# Patient Record
Sex: FEMALE | Race: WHITE | Employment: FULL TIME | URBAN - METROPOLITAN AREA
[De-identification: names, ages, dates, MRNs, and addresses within clinical notes are randomized per-mention and may not be internally consistent; named-entity substitution may affect disease eponyms.]

---

## 2018-03-29 ENCOUNTER — OFFICE VISIT (OUTPATIENT)
Dept: FAMILY MEDICINE CLINIC | Facility: CLINIC | Age: 43
End: 2018-03-29

## 2018-03-29 VITALS
TEMPERATURE: 99 F | WEIGHT: 131 LBS | HEART RATE: 68 BPM | RESPIRATION RATE: 16 BRPM | DIASTOLIC BLOOD PRESSURE: 80 MMHG | BODY MASS INDEX: 21.83 KG/M2 | SYSTOLIC BLOOD PRESSURE: 100 MMHG | HEIGHT: 65 IN

## 2018-03-29 DIAGNOSIS — Z00.00 HEALTHY ADULT ON ROUTINE PHYSICAL EXAMINATION: Primary | ICD-10-CM

## 2018-03-29 DIAGNOSIS — Z00.00 PREVENTATIVE HEALTH CARE: ICD-10-CM

## 2018-03-29 LAB
ALBUMIN SERPL-MCNC: 3.9 G/DL (ref 3.5–4.8)
ALP LIVER SERPL-CCNC: 56 U/L (ref 37–98)
ALT SERPL-CCNC: 22 U/L (ref 14–54)
AST SERPL-CCNC: 17 U/L (ref 15–41)
BASOPHILS # BLD AUTO: 0.03 X10(3) UL (ref 0–0.1)
BASOPHILS NFR BLD AUTO: 0.6 %
BILIRUB SERPL-MCNC: 0.4 MG/DL (ref 0.1–2)
BUN BLD-MCNC: 12 MG/DL (ref 8–20)
CALCIUM BLD-MCNC: 8.7 MG/DL (ref 8.3–10.3)
CHLORIDE: 108 MMOL/L (ref 101–111)
CHOLEST SMN-MCNC: 142 MG/DL (ref ?–200)
CO2: 24 MMOL/L (ref 22–32)
CREAT BLD-MCNC: 0.65 MG/DL (ref 0.55–1.02)
EOSINOPHIL # BLD AUTO: 0.11 X10(3) UL (ref 0–0.3)
EOSINOPHIL NFR BLD AUTO: 2.3 %
ERYTHROCYTE [DISTWIDTH] IN BLOOD BY AUTOMATED COUNT: 12.6 % (ref 11.5–16)
GLUCOSE BLD-MCNC: 78 MG/DL (ref 70–99)
HCT VFR BLD AUTO: 41.1 % (ref 34–50)
HDLC SERPL-MCNC: 68 MG/DL (ref 45–?)
HDLC SERPL: 2.09 {RATIO} (ref ?–4.44)
HGB BLD-MCNC: 13.8 G/DL (ref 12–16)
IMMATURE GRANULOCYTE COUNT: 0 X10(3) UL (ref 0–1)
IMMATURE GRANULOCYTE RATIO %: 0 %
LDLC SERPL CALC-MCNC: 66 MG/DL (ref ?–130)
LYMPHOCYTES # BLD AUTO: 1.42 X10(3) UL (ref 0.9–4)
LYMPHOCYTES NFR BLD AUTO: 29.2 %
M PROTEIN MFR SERPL ELPH: 7.4 G/DL (ref 6.1–8.3)
MCH RBC QN AUTO: 28.2 PG (ref 27–33.2)
MCHC RBC AUTO-ENTMCNC: 33.6 G/DL (ref 31–37)
MCV RBC AUTO: 84 FL (ref 81–100)
MONOCYTES # BLD AUTO: 0.4 X10(3) UL (ref 0.1–1)
MONOCYTES NFR BLD AUTO: 8.2 %
NEUTROPHIL ABS PRELIM: 2.9 X10 (3) UL (ref 1.3–6.7)
NEUTROPHILS # BLD AUTO: 2.9 X10(3) UL (ref 1.3–6.7)
NEUTROPHILS NFR BLD AUTO: 59.7 %
NONHDLC SERPL-MCNC: 74 MG/DL (ref ?–130)
PLATELET # BLD AUTO: 219 10(3)UL (ref 150–450)
POTASSIUM SERPL-SCNC: 4.3 MMOL/L (ref 3.6–5.1)
RBC # BLD AUTO: 4.89 X10(6)UL (ref 3.8–5.1)
RED CELL DISTRIBUTION WIDTH-SD: 37.9 FL (ref 35.1–46.3)
SODIUM SERPL-SCNC: 141 MMOL/L (ref 136–144)
TRIGL SERPL-MCNC: 41 MG/DL (ref ?–150)
VLDLC SERPL CALC-MCNC: 8 MG/DL (ref 5–40)
WBC # BLD AUTO: 4.9 X10(3) UL (ref 4–13)

## 2018-03-29 PROCEDURE — 99396 PREV VISIT EST AGE 40-64: CPT | Performed by: FAMILY MEDICINE

## 2018-03-29 PROCEDURE — 36415 COLL VENOUS BLD VENIPUNCTURE: CPT | Performed by: FAMILY MEDICINE

## 2018-03-29 PROCEDURE — 80061 LIPID PANEL: CPT | Performed by: FAMILY MEDICINE

## 2018-03-29 PROCEDURE — 85025 COMPLETE CBC W/AUTO DIFF WBC: CPT | Performed by: FAMILY MEDICINE

## 2018-03-29 PROCEDURE — 80053 COMPREHEN METABOLIC PANEL: CPT | Performed by: FAMILY MEDICINE

## 2018-03-29 NOTE — PROGRESS NOTES
HPI:   Redd Rebolledo is a 38year old female who presents for a complete physical exam. Symptoms: denies discharge, itching, burning or dysuria, periods are regular, following with gyne, just had a pap last year. . Patient complains of nothing new today. 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 : yes. Children: 2 girls . Exercise: walking.   Diet: watches minimally     REVIEW OF SYSTEMS:   GENERAL: feels well otherwise  S CBC. Not due for screening colonoscopy. Pt info handouts given for: exercise, low fat diet and breast self-exam. Pt' s weight is Body mass index is 21.8 kg/m². , recommended low fat diet and aerobic exercise 30 minutes three times weekly.   The patient indic

## 2018-03-30 ENCOUNTER — TELEPHONE (OUTPATIENT)
Dept: FAMILY MEDICINE CLINIC | Facility: CLINIC | Age: 43
End: 2018-03-30

## 2018-03-30 NOTE — TELEPHONE ENCOUNTER
----- Message from Barbi Bai DO sent at 3/30/2018 12:05 PM CDT -----  Pls let pt know that her Labs are back and are normal including cholesterol, liver and kidney tests and glucose. Blood cell counts are normal also.  We can repeat this at her next p

## 2018-12-13 ENCOUNTER — OFFICE VISIT (OUTPATIENT)
Dept: FAMILY MEDICINE CLINIC | Facility: CLINIC | Age: 43
End: 2018-12-13
Payer: COMMERCIAL

## 2018-12-13 VITALS
OXYGEN SATURATION: 99 % | HEART RATE: 88 BPM | DIASTOLIC BLOOD PRESSURE: 80 MMHG | TEMPERATURE: 98 F | SYSTOLIC BLOOD PRESSURE: 118 MMHG | RESPIRATION RATE: 16 BRPM | WEIGHT: 130 LBS | BODY MASS INDEX: 21.66 KG/M2 | HEIGHT: 65 IN

## 2018-12-13 DIAGNOSIS — J98.01 BRONCHOSPASM: ICD-10-CM

## 2018-12-13 DIAGNOSIS — J40 BRONCHITIS: Primary | ICD-10-CM

## 2018-12-13 DIAGNOSIS — R59.1 LYMPHADENOPATHY OF HEAD AND NECK: ICD-10-CM

## 2018-12-13 PROCEDURE — 99214 OFFICE O/P EST MOD 30 MIN: CPT | Performed by: FAMILY MEDICINE

## 2018-12-13 RX ORDER — BENZONATATE 100 MG/1
100 CAPSULE ORAL 3 TIMES DAILY PRN
Qty: 30 CAPSULE | Refills: 0 | Status: SHIPPED | OUTPATIENT
Start: 2018-12-13 | End: 2019-05-17

## 2018-12-13 RX ORDER — METHYLPREDNISOLONE 4 MG/1
TABLET ORAL
Qty: 1 KIT | Refills: 0 | Status: SHIPPED | OUTPATIENT
Start: 2018-12-13 | End: 2019-05-17

## 2018-12-13 RX ORDER — AZITHROMYCIN 250 MG/1
TABLET, FILM COATED ORAL
Qty: 6 TABLET | Refills: 0 | Status: SHIPPED | OUTPATIENT
Start: 2018-12-13 | End: 2019-05-17

## 2018-12-13 NOTE — PROGRESS NOTES
Grecia Colvin is a 41year old female. Patient presents with:  Cough  Lymph Node: behind ears    HPI:   Amor Levy presents to the office with complaints of upper respiratory tract infection, having congestion for 3 weeks.   She has had a cough and minimal  sputu exertion  CARDIOVASCULAR: denies chest pain on exertion  GI: no nausea or abdominal pain  NEURO: denies headaches    EXAM:   /80   Pulse 88   Temp 98.4 °F (36.9 °C) (Temporal)   Resp 16   Ht 65\"   Wt 130 lb   SpO2 99%   BMI 21.63 kg/m²     EYES: no (three) times daily as needed for cough.        Imaging & Consults:  None        TV#[id number]

## 2019-05-17 ENCOUNTER — HOSPITAL ENCOUNTER (OUTPATIENT)
Age: 44
Discharge: HOME | End: 2019-05-17
Attending: FAMILY MEDICINE
Payer: COMMERCIAL

## 2019-05-17 ENCOUNTER — TELEPHONE (OUTPATIENT)
Dept: FAMILY MEDICINE CLINIC | Facility: CLINIC | Age: 44
End: 2019-05-17

## 2019-05-17 VITALS
RESPIRATION RATE: 16 BRPM | SYSTOLIC BLOOD PRESSURE: 117 MMHG | DIASTOLIC BLOOD PRESSURE: 83 MMHG | HEIGHT: 64 IN | BODY MASS INDEX: 21.34 KG/M2 | HEART RATE: 91 BPM | WEIGHT: 125 LBS | TEMPERATURE: 101 F | OXYGEN SATURATION: 100 %

## 2019-05-17 DIAGNOSIS — L50.9 FULL BODY HIVES: Primary | ICD-10-CM

## 2019-05-17 PROCEDURE — 99213 OFFICE O/P EST LOW 20 MIN: CPT

## 2019-05-17 PROCEDURE — 99204 OFFICE O/P NEW MOD 45 MIN: CPT

## 2019-05-17 RX ORDER — PREDNISONE 20 MG/1
40 TABLET ORAL DAILY
Qty: 10 TABLET | Refills: 0 | Status: SHIPPED | OUTPATIENT
Start: 2019-05-17 | End: 2019-05-17

## 2019-05-17 RX ORDER — FAMOTIDINE 20 MG/1
20 TABLET ORAL ONCE
Status: COMPLETED | OUTPATIENT
Start: 2019-05-17 | End: 2019-05-17

## 2019-05-17 RX ORDER — PREDNISOLONE SODIUM PHOSPHATE 15 MG/5ML
60 SOLUTION ORAL ONCE
Status: COMPLETED | OUTPATIENT
Start: 2019-05-17 | End: 2019-05-17

## 2019-05-17 RX ORDER — METHYLPREDNISOLONE 4 MG/1
TABLET ORAL
Qty: 21 TABLET | Refills: 0 | Status: SHIPPED | OUTPATIENT
Start: 2019-05-17

## 2019-05-17 RX ORDER — IBUPROFEN 200 MG
200 TABLET ORAL EVERY 6 HOURS PRN
COMMUNITY

## 2019-05-17 RX ORDER — DIPHENHYDRAMINE HCL 25 MG
25 CAPSULE ORAL EVERY 6 HOURS PRN
COMMUNITY

## 2019-05-17 RX ORDER — DIPHENHYDRAMINE HYDROCHLORIDE 12.5 MG/5ML
25 SOLUTION ORAL ONCE
Status: COMPLETED | OUTPATIENT
Start: 2019-05-17 | End: 2019-05-17

## 2019-05-17 NOTE — ED PROVIDER NOTES
Patient presents with:  Rash Skin Problem (integumentary)    HPI:     Janie Seen is a 41year old female who presents today with a chief complaint of generalized hives since today  Location: abdomen, bilateral arm, back, chest, face, bilateral hand, Lethaniel Sprinkles supple, symmetrical, trachea midline  Back: no tenderness to percussion or palpation  Lungs: clear to auscultation bilaterally  Heart: S1, S2 normal, no murmur, click, rub or gallop, regular rate and rhythm  Abdomen: soft, non-tender; bowel sounds normal;

## 2019-05-17 NOTE — TELEPHONE ENCOUNTER
Patient states last night at her daughters band concert her head \"felt dry\". Woke up during the night with a pounding headache.   This morning she had a slightly raised splotchy rash all over: chest, arms, legs, back of neck, face  Has tried benadryl twi

## 2019-05-17 NOTE — ED INITIAL ASSESSMENT (HPI)
Pt sts today began with red rash to trunk, neck, face, arms. C/o HA. Denies swelling to lips, throat, chest pain, SOB,  sore throat or recent cold symptoms. Denies new soaps, lotions, foods.

## 2019-10-11 ENCOUNTER — OFFICE VISIT (OUTPATIENT)
Dept: FAMILY MEDICINE CLINIC | Facility: CLINIC | Age: 44
End: 2019-10-11
Payer: COMMERCIAL

## 2019-10-11 VITALS
TEMPERATURE: 98 F | SYSTOLIC BLOOD PRESSURE: 122 MMHG | BODY MASS INDEX: 21.79 KG/M2 | RESPIRATION RATE: 18 BRPM | WEIGHT: 127.63 LBS | HEIGHT: 64 IN | OXYGEN SATURATION: 99 % | DIASTOLIC BLOOD PRESSURE: 70 MMHG | HEART RATE: 76 BPM

## 2019-10-11 DIAGNOSIS — Z00.00 PREVENTATIVE HEALTH CARE: ICD-10-CM

## 2019-10-11 DIAGNOSIS — Z00.00 HEALTHY ADULT ON ROUTINE PHYSICAL EXAMINATION: Primary | ICD-10-CM

## 2019-10-11 PROCEDURE — 99396 PREV VISIT EST AGE 40-64: CPT | Performed by: FAMILY MEDICINE

## 2019-10-11 PROCEDURE — 85025 COMPLETE CBC W/AUTO DIFF WBC: CPT | Performed by: FAMILY MEDICINE

## 2019-10-11 PROCEDURE — 80053 COMPREHEN METABOLIC PANEL: CPT | Performed by: FAMILY MEDICINE

## 2019-10-11 PROCEDURE — 36415 COLL VENOUS BLD VENIPUNCTURE: CPT | Performed by: FAMILY MEDICINE

## 2019-10-11 PROCEDURE — 80061 LIPID PANEL: CPT | Performed by: FAMILY MEDICINE
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
## 2020-10-23 ENCOUNTER — TELEPHONE (OUTPATIENT)
Dept: FAMILY MEDICINE CLINIC | Facility: CLINIC | Age: 45
End: 2020-10-23

## 2020-10-23 ENCOUNTER — IMMUNIZATION (OUTPATIENT)
Dept: FAMILY MEDICINE CLINIC | Facility: CLINIC | Age: 45
End: 2020-10-23
Payer: COMMERCIAL

## 2020-10-23 DIAGNOSIS — Z23 NEED FOR VACCINATION: ICD-10-CM

## 2020-10-23 PROCEDURE — 90471 IMMUNIZATION ADMIN: CPT | Performed by: FAMILY MEDICINE

## 2020-10-23 PROCEDURE — 90686 IIV4 VACC NO PRSV 0.5 ML IM: CPT | Performed by: FAMILY MEDICINE

## 2020-10-23 NOTE — TELEPHONE ENCOUNTER
Patient to clinic for flu vaccine  Last OV 10/11/19  Per LONNIE ok to give flu vaccine today. Patient advised she is due for annual physical.  Patient verbalized understanding. States she will check her work schedule and call back for appointment.

## 2021-03-04 ENCOUNTER — TELEMEDICINE (OUTPATIENT)
Dept: FAMILY MEDICINE CLINIC | Facility: CLINIC | Age: 46
End: 2021-03-04

## 2021-03-04 DIAGNOSIS — R05.9 COUGH IN ADULT PATIENT: Primary | ICD-10-CM

## 2021-03-04 DIAGNOSIS — J98.01 BRONCHOSPASM: ICD-10-CM

## 2021-03-04 PROCEDURE — 99214 OFFICE O/P EST MOD 30 MIN: CPT | Performed by: FAMILY MEDICINE

## 2021-03-04 RX ORDER — METHYLPREDNISOLONE 4 MG/1
TABLET ORAL
Qty: 1 KIT | Refills: 0 | Status: SHIPPED | OUTPATIENT
Start: 2021-03-04

## 2021-03-04 RX ORDER — BENZONATATE 100 MG/1
100 CAPSULE ORAL 3 TIMES DAILY PRN
Qty: 30 CAPSULE | Refills: 0 | Status: SHIPPED | OUTPATIENT
Start: 2021-03-04 | End: 2021-12-07

## 2021-03-04 RX ORDER — AZITHROMYCIN 250 MG/1
TABLET, FILM COATED ORAL
Qty: 6 TABLET | Refills: 0 | Status: SHIPPED | OUTPATIENT
Start: 2021-03-04 | End: 2021-03-09

## 2021-03-04 NOTE — PROGRESS NOTES
This is a telemedicine visit with live, interactive video and audio. Patient understands and accepts financial responsibility for any deductible, co-insurance and/or co-pays associated with this service.     SUBJECTIVE  Cough started about 3 weeks ago w diphenhydrAMINE HCl 25 MG Oral Cap Take 25 mg by mouth every 6 (six) hours as needed for Itching.      • methylPREDNISolone 4 MG Oral Tablet Therapy Pack Use as directed 21 tablet 0       OBJECTIVE  Physical Exam:   alert, appears stated age and cooperative

## 2021-12-07 ENCOUNTER — TELEPHONE (OUTPATIENT)
Dept: FAMILY MEDICINE CLINIC | Facility: CLINIC | Age: 46
End: 2021-12-07

## 2021-12-07 RX ORDER — BENZONATATE 200 MG/1
200 CAPSULE ORAL 3 TIMES DAILY PRN
Qty: 30 CAPSULE | Refills: 0 | Status: SHIPPED | OUTPATIENT
Start: 2021-12-07

## 2021-12-07 RX ORDER — AZITHROMYCIN 250 MG/1
TABLET, FILM COATED ORAL
Qty: 6 TABLET | Refills: 0 | Status: SHIPPED | OUTPATIENT
Start: 2021-12-07 | End: 2021-12-12

## 2021-12-07 NOTE — TELEPHONE ENCOUNTER
Patient called requesting an appt for today video or in-person.     RE: Bronchitis, negative for covid    Please call back # [phone]

## 2021-12-07 NOTE — TELEPHONE ENCOUNTER
Okay, please let patient know I have sent over both medications to her pharmacy. Please follow-up with the office as needed with any concerns.

## 2021-12-07 NOTE — TELEPHONE ENCOUNTER
Patient notified and verbalized understanding. States she is unavailable tomorrow. Has to be in [facility] for work.

## 2021-12-07 NOTE — TELEPHONE ENCOUNTER
Patient states she usually gets this every year. Has been going on for a few weeks. Has been taking sudafed but not going away  Gets a little winded. Feels a little congestion when she coughs.   Was tested for covid due to patient daughters exposure and w

## 2022-03-02 ENCOUNTER — PATIENT MESSAGE (OUTPATIENT)
Dept: FAMILY MEDICINE CLINIC | Facility: CLINIC | Age: 47
End: 2022-03-02

## 2022-03-03 ENCOUNTER — TELEMEDICINE (OUTPATIENT)
Dept: FAMILY MEDICINE CLINIC | Facility: CLINIC | Age: 47
End: 2022-03-03
Payer: COMMERCIAL

## 2022-03-03 ENCOUNTER — PATIENT MESSAGE (OUTPATIENT)
Dept: FAMILY MEDICINE CLINIC | Facility: CLINIC | Age: 47
End: 2022-03-03

## 2022-03-03 DIAGNOSIS — J01.90 SUBACUTE SINUSITIS, UNSPECIFIED LOCATION: Primary | ICD-10-CM

## 2022-03-03 DIAGNOSIS — R05.9 COUGH: ICD-10-CM

## 2022-03-03 PROCEDURE — 99214 OFFICE O/P EST MOD 30 MIN: CPT | Performed by: FAMILY MEDICINE

## 2022-03-03 RX ORDER — METHYLPREDNISOLONE 4 MG/1
TABLET ORAL
Qty: 21 EACH | Refills: 0 | Status: SHIPPED | OUTPATIENT
Start: 2022-03-03

## 2022-03-03 RX ORDER — ALBUTEROL SULFATE 90 UG/1
2 AEROSOL, METERED RESPIRATORY (INHALATION) EVERY 6 HOURS PRN
Qty: 18 G | Refills: 0 | Status: SHIPPED | OUTPATIENT
Start: 2022-03-03

## 2022-03-03 RX ORDER — DOXYCYCLINE 100 MG/1
100 TABLET ORAL 2 TIMES DAILY
Qty: 20 TABLET | Refills: 0 | Status: SHIPPED | OUTPATIENT
Start: 2022-03-03 | End: 2022-03-13

## 2022-03-03 RX ORDER — BENZONATATE 200 MG/1
200 CAPSULE ORAL 3 TIMES DAILY PRN
Qty: 30 CAPSULE | Refills: 0 | Status: SHIPPED | OUTPATIENT
Start: 2022-03-03

## 2022-03-03 NOTE — TELEPHONE ENCOUNTER
Per Dr. Bridget Zuñiga - able to do video visit now if pt able    Left detailed message to voicemail (per verbal release form consent with confirmed identifying message) - inquiring if pt able to do video visit right now with Dr. Bridget Zuñiga    [facility] also sent to pt    Future Appointments   Date Time Provider Carolyn Kim   3/3/2022  2:15 PM Callie Purdy DO [facility] DAKOTA Dye   4/4/2022  1:00 PM DO AUGUST Dove

## 2022-03-03 NOTE — TELEPHONE ENCOUNTER
Left detailed message to voicemail (per verbal release form consent with confirmed identifying message) of doctor's note below.  Patient advised to call office back - need to schedule video visit for today 2:30pm    [facility] also sent to pt regarding note below

## 2022-07-19 ENCOUNTER — HOSPITAL ENCOUNTER (OUTPATIENT)
Age: 47
Discharge: HOME | End: 2022-07-19
Payer: COMMERCIAL

## 2022-07-19 VITALS
TEMPERATURE: 98 F | SYSTOLIC BLOOD PRESSURE: 122 MMHG | DIASTOLIC BLOOD PRESSURE: 86 MMHG | OXYGEN SATURATION: 98 % | RESPIRATION RATE: 18 BRPM | HEART RATE: 70 BPM

## 2022-07-19 DIAGNOSIS — Z20.822 CLOSE EXPOSURE TO 2019 NOVEL CORONAVIRUS: Primary | ICD-10-CM

## 2022-07-19 LAB — SARS-COV-2 RNA RESP QL NAA+PROBE: NOT DETECTED

## 2022-07-19 PROCEDURE — 99202 OFFICE O/P NEW SF 15 MIN: CPT | Performed by: PHYSICIAN ASSISTANT

## 2022-07-19 PROCEDURE — U0002 COVID-19 LAB TEST NON-CDC: HCPCS | Performed by: PHYSICIAN ASSISTANT

## 2023-11-07 ENCOUNTER — OFFICE VISIT (OUTPATIENT)
Dept: FAMILY MEDICINE CLINIC | Facility: CLINIC | Age: 48
End: 2023-11-07
Payer: COMMERCIAL

## 2023-11-07 VITALS
HEART RATE: 67 BPM | DIASTOLIC BLOOD PRESSURE: 62 MMHG | TEMPERATURE: 99 F | WEIGHT: 134.25 LBS | BODY MASS INDEX: 23 KG/M2 | OXYGEN SATURATION: 99 % | SYSTOLIC BLOOD PRESSURE: 100 MMHG | RESPIRATION RATE: 18 BRPM

## 2023-11-07 DIAGNOSIS — Z23 NEED FOR VACCINATION: ICD-10-CM

## 2023-11-07 DIAGNOSIS — Z00.00 HEALTHY ADULT ON ROUTINE PHYSICAL EXAMINATION: Primary | ICD-10-CM

## 2023-11-07 DIAGNOSIS — R05.9 COUGH: ICD-10-CM

## 2023-11-07 DIAGNOSIS — J01.00 ACUTE NON-RECURRENT MAXILLARY SINUSITIS: ICD-10-CM

## 2023-11-07 LAB
ALBUMIN SERPL-MCNC: 4.1 G/DL (ref 3.4–5)
ALBUMIN/GLOB SERPL: 1.2 {RATIO} (ref 1–2)
ALP LIVER SERPL-CCNC: 71 U/L
ALT SERPL-CCNC: 32 U/L
ANION GAP SERPL CALC-SCNC: 2 MMOL/L (ref 0–18)
AST SERPL-CCNC: 23 U/L (ref 15–37)
BASOPHILS # BLD AUTO: 0.03 X10(3) UL (ref 0–0.2)
BASOPHILS NFR BLD AUTO: 0.6 %
BILIRUB SERPL-MCNC: 0.6 MG/DL (ref 0.1–2)
BUN BLD-MCNC: 10 MG/DL (ref 9–23)
CALCIUM BLD-MCNC: 9 MG/DL (ref 8.5–10.1)
CHLORIDE SERPL-SCNC: 109 MMOL/L (ref 98–112)
CHOLEST SERPL-MCNC: 176 MG/DL (ref ?–200)
CO2 SERPL-SCNC: 28 MMOL/L (ref 21–32)
CREAT BLD-MCNC: 0.84 MG/DL
EGFRCR SERPLBLD CKD-EPI 2021: 87 ML/MIN/1.73M2 (ref 60–?)
EOSINOPHIL # BLD AUTO: 0.16 X10(3) UL (ref 0–0.7)
EOSINOPHIL NFR BLD AUTO: 3.2 %
ERYTHROCYTE [DISTWIDTH] IN BLOOD BY AUTOMATED COUNT: 11.6 %
FASTING PATIENT LIPID ANSWER: YES
FASTING STATUS PATIENT QL REPORTED: YES
GLOBULIN PLAS-MCNC: 3.5 G/DL (ref 2.8–4.4)
GLUCOSE BLD-MCNC: 88 MG/DL (ref 70–99)
HCT VFR BLD AUTO: 41.7 %
HDLC SERPL-MCNC: 80 MG/DL (ref 40–59)
HGB BLD-MCNC: 13.9 G/DL
IMM GRANULOCYTES # BLD AUTO: 0.01 X10(3) UL (ref 0–1)
IMM GRANULOCYTES NFR BLD: 0.2 %
LDLC SERPL CALC-MCNC: 88 MG/DL (ref ?–100)
LYMPHOCYTES # BLD AUTO: 1.74 X10(3) UL (ref 1–4)
LYMPHOCYTES NFR BLD AUTO: 34.5 %
MCH RBC QN AUTO: 28.6 PG (ref 26–34)
MCHC RBC AUTO-ENTMCNC: 33.3 G/DL (ref 31–37)
MCV RBC AUTO: 85.8 FL
MONOCYTES # BLD AUTO: 0.28 X10(3) UL (ref 0.1–1)
MONOCYTES NFR BLD AUTO: 5.6 %
NEUTROPHILS # BLD AUTO: 2.82 X10 (3) UL (ref 1.5–7.7)
NEUTROPHILS # BLD AUTO: 2.82 X10(3) UL (ref 1.5–7.7)
NEUTROPHILS NFR BLD AUTO: 55.9 %
NONHDLC SERPL-MCNC: 96 MG/DL (ref ?–130)
OSMOLALITY SERPL CALC.SUM OF ELEC: 286 MOSM/KG (ref 275–295)
PLATELET # BLD AUTO: 244 10(3)UL (ref 150–450)
POTASSIUM SERPL-SCNC: 4.4 MMOL/L (ref 3.5–5.1)
PROT SERPL-MCNC: 7.6 G/DL (ref 6.4–8.2)
RBC # BLD AUTO: 4.86 X10(6)UL
SODIUM SERPL-SCNC: 139 MMOL/L (ref 136–145)
TRIGL SERPL-MCNC: 39 MG/DL (ref 30–149)
VLDLC SERPL CALC-MCNC: 6 MG/DL (ref 0–30)
WBC # BLD AUTO: 5 X10(3) UL (ref 4–11)

## 2023-11-07 PROCEDURE — 80053 COMPREHEN METABOLIC PANEL: CPT | Performed by: FAMILY MEDICINE

## 2023-11-07 PROCEDURE — 85025 COMPLETE CBC W/AUTO DIFF WBC: CPT | Performed by: FAMILY MEDICINE

## 2023-11-07 PROCEDURE — 80061 LIPID PANEL: CPT | Performed by: FAMILY MEDICINE

## 2023-11-07 PROCEDURE — 90471 IMMUNIZATION ADMIN: CPT | Performed by: FAMILY MEDICINE

## 2023-11-07 PROCEDURE — 3074F SYST BP LT 130 MM HG: CPT | Performed by: FAMILY MEDICINE

## 2023-11-07 PROCEDURE — 90686 IIV4 VACC NO PRSV 0.5 ML IM: CPT | Performed by: FAMILY MEDICINE

## 2023-11-07 PROCEDURE — 3078F DIAST BP <80 MM HG: CPT | Performed by: FAMILY MEDICINE

## 2023-11-07 PROCEDURE — 99396 PREV VISIT EST AGE 40-64: CPT | Performed by: FAMILY MEDICINE

## 2023-11-07 RX ORDER — MULTIVITAMIN
TABLET ORAL
COMMUNITY

## 2023-11-07 RX ORDER — PHENOL 1.4 %
600 AEROSOL, SPRAY (ML) MUCOUS MEMBRANE
COMMUNITY

## 2023-11-07 RX ORDER — LORATADINE 10 MG/1
10 TABLET ORAL DAILY
COMMUNITY

## 2023-11-07 RX ORDER — BENZONATATE 200 MG/1
200 CAPSULE ORAL 3 TIMES DAILY PRN
Qty: 30 CAPSULE | Refills: 0 | Status: SHIPPED | OUTPATIENT
Start: 2023-11-07

## 2023-11-07 RX ORDER — RIBOFLAVIN (VITAMIN B2) 100 MG
100 TABLET ORAL DAILY
COMMUNITY

## 2023-11-07 RX ORDER — ALBUTEROL SULFATE 90 UG/1
2 AEROSOL, METERED RESPIRATORY (INHALATION) EVERY 6 HOURS PRN
Qty: 18 G | Refills: 0 | Status: SHIPPED | OUTPATIENT
Start: 2023-11-07

## 2023-11-07 RX ORDER — DOXYCYCLINE HYCLATE 100 MG/1
100 CAPSULE ORAL 2 TIMES DAILY
Qty: 20 CAPSULE | Refills: 0 | Status: SHIPPED | OUTPATIENT
Start: 2023-11-07 | End: 2023-11-17

## 2023-11-13 ENCOUNTER — TELEPHONE (OUTPATIENT)
Dept: FAMILY MEDICINE CLINIC | Facility: CLINIC | Age: 48
End: 2023-11-13

## 2023-11-13 DIAGNOSIS — J01.00 ACUTE NON-RECURRENT MAXILLARY SINUSITIS: Primary | ICD-10-CM

## 2023-11-13 RX ORDER — PREDNISONE 20 MG/1
40 TABLET ORAL DAILY
Qty: 10 TABLET | Refills: 0 | Status: SHIPPED | OUTPATIENT
Start: 2023-11-13 | End: 2023-11-18

## 2023-11-13 NOTE — TELEPHONE ENCOUNTER
PT CALLED AND ADV WAS SEEN LAST WEEK - WAS PLACED ON ANTIBIOTIC AND INHALER.     ADV STILL NOT FEELING BETTER - HAVING LOTS OF COUGHING - ADV THAT SOME STUFF COMING UP BUT NOT MUCH (ADV FEELS STUCK)    LOOKING TO SEE IF ANYTHING ELSE CAN BE CALLED IN (STEROID)    PLEASE ADV    [address]

## 2023-11-13 NOTE — TELEPHONE ENCOUNTER
Patient notified via detailed voicemail left at cell number (ok per  HIPAA consent)    Mychart sent as well

## 2024-03-07 ENCOUNTER — TELEMEDICINE (OUTPATIENT)
Dept: FAMILY MEDICINE CLINIC | Facility: CLINIC | Age: 49
End: 2024-03-07
Payer: COMMERCIAL

## 2024-03-07 DIAGNOSIS — J01.10 ACUTE NON-RECURRENT FRONTAL SINUSITIS: Primary | ICD-10-CM

## 2024-03-07 PROCEDURE — 99214 OFFICE O/P EST MOD 30 MIN: CPT | Performed by: FAMILY MEDICINE

## 2024-03-07 RX ORDER — PREDNISONE 20 MG/1
40 TABLET ORAL DAILY
Qty: 10 TABLET | Refills: 0 | Status: SHIPPED | OUTPATIENT
Start: 2024-03-07 | End: 2024-03-12

## 2024-03-07 RX ORDER — AZITHROMYCIN 250 MG/1
TABLET, FILM COATED ORAL
Qty: 6 TABLET | Refills: 0 | Status: SHIPPED | OUTPATIENT
Start: 2024-03-07 | End: 2024-03-11
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
## 2024-11-14 ENCOUNTER — TELEMEDICINE (OUTPATIENT)
Dept: FAMILY MEDICINE CLINIC | Facility: CLINIC | Age: 49
End: 2024-11-14
Payer: COMMERCIAL

## 2024-11-14 DIAGNOSIS — J01.10 ACUTE NON-RECURRENT FRONTAL SINUSITIS: Primary | ICD-10-CM

## 2024-11-14 DIAGNOSIS — Z12.11 SCREENING FOR MALIGNANT NEOPLASM OF COLON: ICD-10-CM

## 2024-11-14 PROCEDURE — 99214 OFFICE O/P EST MOD 30 MIN: CPT | Performed by: FAMILY MEDICINE

## 2024-11-14 RX ORDER — DOXYCYCLINE 100 MG/1
100 CAPSULE ORAL 2 TIMES DAILY
Qty: 20 CAPSULE | Refills: 0 | Status: SHIPPED | OUTPATIENT
Start: 2024-11-14 | End: 2024-11-24

## 2024-11-14 RX ORDER — PREDNISONE 20 MG/1
40 TABLET ORAL DAILY
Qty: 10 TABLET | Refills: 0 | Status: SHIPPED | OUTPATIENT
Start: 2024-11-14 | End: 2024-11-19
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
## 2024-11-15 DIAGNOSIS — R05.9 COUGH: ICD-10-CM

## 2024-11-15 RX ORDER — BENZONATATE 200 MG/1
200 CAPSULE ORAL 3 TIMES DAILY PRN
Qty: 30 CAPSULE | Refills: 0 | Status: SHIPPED | OUTPATIENT
Start: 2024-11-15

## 2024-11-15 NOTE — TELEPHONE ENCOUNTER
[facility] #[id number] - [place], [place] - [address] AT [facility] [address], [phone], [phone]   [address] [facility] [address]   Phone: [phone] Fax: [phone]   Hours: Not open 24 hours       PATIENT REQUESTING REFILL FOR            Sig:   Take 1 capsule (200 mg total) by mouth 3 (three) times daily as needed for cough.     Route:   Oral     PRN Reason(s):   cough     Order #:   [id number]

## 2024-11-15 NOTE — TELEPHONE ENCOUNTER
Last OV 11/14/24 telemed, in office 11-7-2023  Benzonatate last refilled 11-7-2023  #30  0 refill

## 2024-12-03 ENCOUNTER — TELEPHONE (OUTPATIENT)
Dept: FAMILY MEDICINE CLINIC | Facility: CLINIC | Age: 49
End: 2024-12-03

## 2024-12-03 NOTE — TELEPHONE ENCOUNTER
Rhonda needs to see me in office if possible. Or be seen somewhere.   Needs to have lugns listened to.

## 2024-12-03 NOTE — TELEPHONE ENCOUNTER
Patient notified and scheduled    Future Appointments   Date Time Provider Department Center   12/5/2024  9:45 AM Radha Bowers DO [facility]

## 2024-12-03 NOTE — TELEPHONE ENCOUNTER
PT CALLED AND ADV THAT SHE DID VV ON 11/14/24.    PT ADV THOUGHT SHE WAS GETTING BETTER BUT COUGH AND HEADACHE ARE WORSE.    LOOKING FOR RECOMMENDATIONS    PLEASE ADV    THANK YOU

## 2024-12-03 NOTE — TELEPHONE ENCOUNTER
Patient states she did VV with Dr Bowers for sinus infection.   Tooke medication and starting to feel better but never fully went away. Now feeling worse again.   States she is coughing a lot. States she feels something in her throat but can not cough up anything.  Has sinus congestion, but is unable to blow anything out. Blowing nose triggers coughing fit.  Has headache.    Looking for further recommendations.

## 2024-12-05 ENCOUNTER — OFFICE VISIT (OUTPATIENT)
Dept: FAMILY MEDICINE CLINIC | Facility: CLINIC | Age: 49
End: 2024-12-05
Payer: COMMERCIAL

## 2024-12-05 ENCOUNTER — HOSPITAL ENCOUNTER (OUTPATIENT)
Dept: GENERAL RADIOLOGY | Age: 49
Discharge: HOME | End: 2024-12-05
Attending: FAMILY MEDICINE
Payer: COMMERCIAL

## 2024-12-05 ENCOUNTER — TELEPHONE (OUTPATIENT)
Dept: FAMILY MEDICINE CLINIC | Facility: CLINIC | Age: 49
End: 2024-12-05

## 2024-12-05 VITALS
SYSTOLIC BLOOD PRESSURE: 110 MMHG | DIASTOLIC BLOOD PRESSURE: 62 MMHG | TEMPERATURE: 98 F | HEART RATE: 88 BPM | WEIGHT: 142.63 LBS | BODY MASS INDEX: 24 KG/M2 | OXYGEN SATURATION: 99 % | RESPIRATION RATE: 18 BRPM

## 2024-12-05 DIAGNOSIS — R05.2 SUBACUTE COUGH: Primary | ICD-10-CM

## 2024-12-05 DIAGNOSIS — Z23 NEED FOR VACCINATION: ICD-10-CM

## 2024-12-05 DIAGNOSIS — R05.2 SUBACUTE COUGH: ICD-10-CM

## 2024-12-05 PROCEDURE — 3074F SYST BP LT 130 MM HG: CPT | Performed by: FAMILY MEDICINE

## 2024-12-05 PROCEDURE — 99214 OFFICE O/P EST MOD 30 MIN: CPT | Performed by: FAMILY MEDICINE

## 2024-12-05 PROCEDURE — 3078F DIAST BP <80 MM HG: CPT | Performed by: FAMILY MEDICINE

## 2024-12-05 PROCEDURE — 71046 X-RAY EXAM CHEST 2 VIEWS: CPT | Performed by: FAMILY MEDICINE

## 2024-12-05 RX ORDER — CEFDINIR 300 MG/1
300 CAPSULE ORAL 2 TIMES DAILY
Qty: 20 CAPSULE | Refills: 0 | Status: SHIPPED | OUTPATIENT
Start: 2024-12-05 | End: 2024-12-15

## 2024-12-05 NOTE — TELEPHONE ENCOUNTER
Pharmacy calling to confirm ok to dispense cefdinir. Patient has penicillin allergy.    Spoke with Dr Bowers who states she is aware of patient allergy to PCN  States she discussed with patient in office. Not a true allergy. No hives or itching.  Ok for cefdinir     Pharmacy notified and verbalized understanding.

## 2024-12-05 NOTE — PROGRESS NOTES
Rhonda Dove is a 49 year old female.  Chief Complaint   Patient presents with    Follow - Up     URI        HPI:   2-3 weeks ago I gave her doxy and prednisone for sinuses. It got better for a little bit, and then got worse again.   No fevers.   No SOB, tightness or wheezing.   Coughs more when she talks. Still feels a lot of sinus pressure. Daughter was also sick and she was exposed to her again.     Had a rash towards the end of amoxil a few yrs ago. Not itching. Just little acne.   Got a similar thing towards the end of this one. Now it's gone. Didn't bother her. She does not think it's an allergy.     ALLERGIES:  Allergies[1]      Current Outpatient Medications   Medication Sig Dispense Refill    cefdinir 300 MG Oral Cap Take 1 capsule (300 mg total) by mouth 2 (two) times daily for 10 days. 20 capsule 0    benzonatate 200 MG Oral Cap Take 1 capsule (200 mg total) by mouth 3 (three) times daily as needed for cough. 30 capsule 0    Ascorbic Acid (VITAMIN C) 100 MG Oral Tab Take 1 tablet (100 mg total) by mouth daily.      vitamin E 100 UNITS Oral Cap Take 1 capsule (100 Units total) by mouth daily.      Multiple Vitamin (MULTI-VITAMIN DAILY) Oral Tab Take by mouth.      Calcium Carbonate 600 MG Oral Tab Take 1 tablet (600 mg total) by mouth.      loratadine 10 MG Oral Tab Take 1 tablet (10 mg total) by mouth daily.      albuterol 108 (90 Base) MCG/ACT Inhalation Aero Soln Inhale 2 puffs into the lungs every 6 (six) hours as needed for Wheezing. 18 g 0    ibuprofen 200 MG Oral Tab Take 1 tablet (200 mg total) by mouth every 6 (six) hours as needed for Pain.      diphenhydrAMINE HCl 25 MG Oral Cap Take 1 capsule (25 mg total) by mouth every 6 (six) hours as needed for Itching. (Patient not taking: Reported on 12/5/2024)      methylPREDNISolone 4 MG Oral Tablet Therapy Pack Use as directed (Patient not taking: Reported on 12/5/2024) 21 tablet 0      No past medical history on file.   Social History:  Social  History     Socioeconomic History    Marital status:    Tobacco Use    Smoking status: Never    Smokeless tobacco: Never   Vaping Use    Vaping status: Never Used   Substance and Sexual Activity    Alcohol use: Yes     Alcohol/week: 0.0 standard drinks of alcohol     Comment: 8/9 a month    Drug use: No   Other Topics Concern    Caffeine Concern Yes     Comment: one a day    Exercise Yes     Comment: walks almost 3 miles a day     Social Drivers of Health      Received from [facility], [facility]    Social Connections    Received from [facility], [facility]    Housing Stability        BP Readings from Last 6 Encounters:   12/05/24 110/62   11/07/23 100/62   07/19/22 122/86   10/11/19 122/70   05/17/19 117/83   12/13/18 118/80       Wt Readings from Last 6 Encounters:   12/05/24 142 lb 9.6 oz (64.7 kg)   11/07/23 134 lb 4 oz (60.9 kg)   10/11/19 127 lb 9.6 oz (57.9 kg)   05/17/19 125 lb (56.7 kg)   12/13/18 130 lb (59 kg)   03/29/18 131 lb (59.4 kg)       REVIEW OF SYSTEMS:   GENERAL HEALTH: feels well no complaints  SKIN: denies any unusual skin lesions or rashes  RESPIRATORY: denies shortness of breath with exertion  CARDIOVASCULAR: denies chest pain on exertion  GI: denies abdominal pain and denies heartburn  NEURO: denies headaches    EXAM:   /62   Pulse 88   Temp 98.1 °F (36.7 °C) (Temporal)   Resp 18   Wt 142 lb 9.6 oz (64.7 kg)   SpO2 99%   BMI 24.48 kg/m²  Body mass index is 24.48 kg/m².      GENERAL: well developed, well nourished,in no apparent distress  SKIN: no rashes,no suspicious lesions  HEENT: atraumatic, normocephalic,ears and throat are clear, + sinus tenderness   NECK: supple,no adenopathy,   LUNGS: clear to auscultation, no rales or wheezing   CARDIO: RRR without murmur  GI: good BS's,no masses, HSM or tenderness  EXTREMITIES: no cyanosis, clubbing or edema    ASSESSMENT AND PLAN:     Encounter  Diagnoses   Name Primary?    Subacute cough Yes    Need for vaccination        Diagnoses and all orders for this visit:    Subacute cough  -     XR CHEST PA + LAT CHEST (CPT=71046); Future  -     cefdinir 300 MG Oral Cap; Take 1 capsule (300 mg total) by mouth 2 (two) times daily for 10 days.    Need for vaccination  -     Fluzone trivalent vaccine, PF 0.5mL, 6mo+ (79024)  -     cefdinir 300 MG Oral Cap; Take 1 capsule (300 mg total) by mouth 2 (two) times daily for 10 days.    Treat as above. Penicillin is not a severe allergy, if it is an allergy at all.   CXR does not show PNA.     Orders Placed This Encounter   Procedures    Fluzone trivalent vaccine, PF 0.5mL, 6mo+ (55882)               Meds & Refills for this Visit:  Requested Prescriptions     Signed Prescriptions Disp Refills    cefdinir 300 MG Oral Cap 20 capsule 0     Sig: Take 1 capsule (300 mg total) by mouth 2 (two) times daily for 10 days.             The patient indicates understanding of these issues and agrees to the plan.               [1]   Allergies  Allergen Reactions    Sulfa Antibiotics     Pcn [Penicillins]

## 2024-12-20 ENCOUNTER — TELEPHONE (OUTPATIENT)
Dept: FAMILY MEDICINE CLINIC | Facility: CLINIC | Age: 49
End: 2024-12-20

## 2024-12-20 DIAGNOSIS — R05.9 COUGH: Primary | ICD-10-CM

## 2024-12-20 RX ORDER — ALBUTEROL SULFATE 90 UG/1
2 INHALANT RESPIRATORY (INHALATION) EVERY 6 HOURS PRN
Qty: 18 G | Refills: 0 | Status: SHIPPED | OUTPATIENT
Start: 2024-12-20

## 2024-12-20 RX ORDER — METHYLPREDNISOLONE 4 MG/1
TABLET ORAL
Qty: 21 TABLET | Refills: 0 | Status: SHIPPED | OUTPATIENT
Start: 2024-12-20

## 2024-12-20 NOTE — TELEPHONE ENCOUNTER
Since she is taking the inhaler  we can consider medrol pack.     If sinus headaches are gone, that's a good sign that the sinus infection is treated.   I do not think she needs more abx.

## 2024-12-20 NOTE — TELEPHONE ENCOUNTER
Patient verbalized understanding.   Would like script for steroid.  Also asking for refill of albuterol    Scripts pended for approval to patient preferred pharmacy

## 2024-12-20 NOTE — TELEPHONE ENCOUNTER
[facility] #[id number] - [place], [place] - [address] AT [facility] [address], [phone], [phone]   [address] [facility] [address]   Phone: [phone] Fax: [phone]   Hours: Not open 24 hours       PATIENT HAD VISIT WITH DR FLEMING ON 12-5-2024.  DR FLEMING PRESCRIBED CEFDINIR 300MG.  PATIENT CALLING TO SAY THAT SHE FINISHED THE CEFDINIR WITH NO ALLERGIC REACTION.  PATIENT SAYS HER SINUS HEADACHE IS GONE.  CANNOT STOP COUGHING.  DRY COUGH.  WORSE AT NIGHT.  PATIENT ALSO ADDED THAT SHE IS TAKING ALBUTEROL A FEW TIMES A DAY, BUT NOT NOTICING A DIFFERENCE.  PATIENT ASKING IF DR FLEMING CAN RECOMMEND SOMETHING FOR HER DRY COUGH.

## 2025-01-23 LAB — AMB EXT COLOGUARD RESULT: NEGATIVE

## 2025-01-31 ENCOUNTER — MED REC SCAN ONLY (OUTPATIENT)
Dept: FAMILY MEDICINE CLINIC | Facility: CLINIC | Age: 50
End: 2025-01-31

## 2025-01-31 ENCOUNTER — TELEPHONE (OUTPATIENT)
Dept: FAMILY MEDICINE CLINIC | Facility: CLINIC | Age: 50
End: 2025-01-31

## 2025-01-31 NOTE — TELEPHONE ENCOUNTER
Cologuard result received by fax and reviewed by KE.    Per Dr Bowers, Cologuard negative. Repeat in 3 years    Entered in external result.  Report to scanning.